# Patient Record
Sex: FEMALE | Race: WHITE | NOT HISPANIC OR LATINO | Employment: OTHER | URBAN - METROPOLITAN AREA
[De-identification: names, ages, dates, MRNs, and addresses within clinical notes are randomized per-mention and may not be internally consistent; named-entity substitution may affect disease eponyms.]

---

## 2023-06-28 ENCOUNTER — PATIENT OUTREACH (OUTPATIENT)
Dept: CARE COORDINATION | Facility: CLINIC | Age: 73
End: 2023-06-28
Payer: MEDICARE

## 2023-06-28 NOTE — PROGRESS NOTES
Patient returned CM call states she is home and doing well, patient and  are looking for PCP within their area, CM gave patient number to scheduling to find a provider near their home.  Patient did not have any questions at this time.  MELINDA RandleN, RN.

## 2023-07-13 ENCOUNTER — APPOINTMENT (OUTPATIENT)
Dept: PRIMARY CARE | Facility: CLINIC | Age: 73
End: 2023-07-13
Payer: MEDICARE

## 2023-07-18 ENCOUNTER — OFFICE VISIT (OUTPATIENT)
Dept: PRIMARY CARE | Facility: CLINIC | Age: 73
End: 2023-07-18
Payer: MEDICARE

## 2023-07-18 VITALS
BODY MASS INDEX: 35.65 KG/M2 | SYSTOLIC BLOOD PRESSURE: 120 MMHG | HEART RATE: 65 BPM | HEIGHT: 63 IN | WEIGHT: 201.2 LBS | OXYGEN SATURATION: 95 % | DIASTOLIC BLOOD PRESSURE: 80 MMHG

## 2023-07-18 DIAGNOSIS — I10 PRIMARY HYPERTENSION: ICD-10-CM

## 2023-07-18 DIAGNOSIS — L71.9 ACNE ROSACEA: ICD-10-CM

## 2023-07-18 DIAGNOSIS — E55.9 VITAMIN D DEFICIENCY: ICD-10-CM

## 2023-07-18 DIAGNOSIS — Z12.31 ENCOUNTER FOR SCREENING MAMMOGRAM FOR BREAST CANCER: Primary | ICD-10-CM

## 2023-07-18 DIAGNOSIS — E53.8 VITAMIN B12 DEFICIENCY: ICD-10-CM

## 2023-07-18 PROCEDURE — 3079F DIAST BP 80-89 MM HG: CPT | Performed by: FAMILY MEDICINE

## 2023-07-18 PROCEDURE — 1159F MED LIST DOCD IN RCRD: CPT | Performed by: FAMILY MEDICINE

## 2023-07-18 PROCEDURE — 1160F RVW MEDS BY RX/DR IN RCRD: CPT | Performed by: FAMILY MEDICINE

## 2023-07-18 PROCEDURE — 99214 OFFICE O/P EST MOD 30 MIN: CPT | Performed by: FAMILY MEDICINE

## 2023-07-18 PROCEDURE — 3074F SYST BP LT 130 MM HG: CPT | Performed by: FAMILY MEDICINE

## 2023-07-18 PROCEDURE — 1036F TOBACCO NON-USER: CPT | Performed by: FAMILY MEDICINE

## 2023-07-18 RX ORDER — AMLODIPINE BESYLATE 5 MG/1
5 TABLET ORAL
COMMUNITY
End: 2023-12-08 | Stop reason: SDUPTHER

## 2023-07-18 RX ORDER — DOXYCYCLINE HYCLATE 20 MG
20 TABLET ORAL 2 TIMES DAILY
COMMUNITY

## 2023-07-18 RX ORDER — DICLOFENAC SODIUM 10 MG/G
GEL TOPICAL
COMMUNITY
Start: 2022-01-11

## 2023-07-18 RX ORDER — ATENOLOL 25 MG/1
25 TABLET ORAL
COMMUNITY
End: 2023-12-08 | Stop reason: SDUPTHER

## 2023-07-18 RX ORDER — CYCLOSPORINE 0.5 MG/ML
EMULSION OPHTHALMIC
COMMUNITY

## 2023-07-18 RX ORDER — DULOXETIN HYDROCHLORIDE 30 MG/1
30 CAPSULE, DELAYED RELEASE ORAL
COMMUNITY
End: 2024-02-05 | Stop reason: SDUPTHER

## 2023-07-18 RX ORDER — SIMVASTATIN 20 MG/1
20 TABLET, FILM COATED ORAL EVERY EVENING
COMMUNITY
End: 2023-07-26 | Stop reason: SDUPTHER

## 2023-07-18 RX ORDER — ERYTHROMYCIN 5 MG/G
OINTMENT OPHTHALMIC
COMMUNITY
Start: 2014-11-13

## 2023-07-18 RX ORDER — UBIDECARENONE 75 MG
500 CAPSULE ORAL
COMMUNITY

## 2023-07-18 NOTE — PROGRESS NOTES
"Subjective   Patient ID: Therese Mike is a 72 y.o. female who presents for New Patient Visit (New patient here to establish care.).    HPI   Previous family doctor retired  Jadon guadarrama by     Admitted in June for hypoxia with neg covid flu c. Diff  mild hyokalemia, sl elevated ast neg bnp and troponin  had stomach flu  and after a week was better     HTN well controlled on meds    Hyperlipidemia on zocor dx 2005 with no problems    Dry eye syndrome     H/o vit d def but was normal last check      Will restart vitamin D     Vitamin b12 on po daily     Acne rosacea Derm     Colonoscopy x 3 sees DRT this August for normal follow up      Last was 2020  Mammogram per gyn       Involved in [facility]  3 crosses in [place]       Review of Systems    Objective   /80   Pulse 65   Ht 1.598 m (5' 2.91\")   Wt 91.3 kg (201 lb 3.2 oz)   SpO2 95%   BMI 35.74 kg/m²     Physical Exam  Vitals reviewed.   Constitutional:       Appearance: Normal appearance.   HENT:      Head: Normocephalic and atraumatic.   Eyes:      Conjunctiva/sclera: Conjunctivae normal.   Cardiovascular:      Rate and Rhythm: Normal rate and regular rhythm.   Pulmonary:      Effort: Pulmonary effort is normal.      Breath sounds: Normal breath sounds.   Musculoskeletal:      Cervical back: Neck supple.   Skin:     General: Skin is warm and dry.   Neurological:      General: No focal deficit present.      Mental Status: She is alert and oriented to person, place, and time.   Psychiatric:         Mood and Affect: Mood normal.         Behavior: Behavior normal.         Thought Content: Thought content normal.         Judgment: Judgment normal.         Assessment/Plan   Diagnoses and all orders for this visit:  Encounter for screening mammogram for breast cancer  -     BI mammo bilateral screening tomosynthesis; Future  Acne rosacea  -     Referral to Dermatology; Future  Primary hypertension  -     Lipid Panel; Future  -     Basic Metabolic Panel; " Future  Vitamin D deficiency  -     Vitamin D 25-Hydroxy,Total; Future  Vitamin B12 deficiency  -     Vitamin B12; Future

## 2023-07-24 DIAGNOSIS — E78.5 HYPERLIPIDEMIA, UNSPECIFIED HYPERLIPIDEMIA TYPE: ICD-10-CM

## 2023-07-24 RX ORDER — SIMVASTATIN 20 MG/1
20 TABLET, FILM COATED ORAL EVERY EVENING
Qty: 90 TABLET | Refills: 3 | OUTPATIENT
Start: 2023-07-24

## 2023-07-26 PROBLEM — E78.5 HYPERLIPIDEMIA: Status: ACTIVE | Noted: 2023-07-26

## 2023-07-26 RX ORDER — SIMVASTATIN 20 MG/1
20 TABLET, FILM COATED ORAL EVERY EVENING
Qty: 90 TABLET | Refills: 3 | Status: SHIPPED | OUTPATIENT
Start: 2023-07-26 | End: 2024-02-05 | Stop reason: SDUPTHER

## 2023-10-02 ENCOUNTER — HOSPITAL ENCOUNTER (OUTPATIENT)
Dept: RADIOLOGY | Facility: HOSPITAL | Age: 73
Discharge: HOME | End: 2023-10-02
Payer: MEDICARE

## 2023-10-02 DIAGNOSIS — K86.2 CYST OF PANCREAS (HHS-HCC): ICD-10-CM

## 2023-10-02 PROCEDURE — 2550000001 HC RX 255 CONTRASTS: Performed by: INTERNAL MEDICINE

## 2023-10-02 PROCEDURE — 74183 MRI ABD W/O CNTR FLWD CNTR: CPT | Performed by: RADIOLOGY

## 2023-10-02 PROCEDURE — A9575 INJ GADOTERATE MEGLUMI 0.1ML: HCPCS | Performed by: INTERNAL MEDICINE

## 2023-10-02 PROCEDURE — 74183 MRI ABD W/O CNTR FLWD CNTR: CPT

## 2023-10-02 RX ORDER — GADOTERATE MEGLUMINE 376.9 MG/ML
18 INJECTION INTRAVENOUS
Status: COMPLETED | OUTPATIENT
Start: 2023-10-02 | End: 2023-10-02

## 2023-10-02 RX ADMIN — GADOTERATE MEGLUMINE 18 ML: 376.9 INJECTION INTRAVENOUS at 16:15

## 2023-12-05 DIAGNOSIS — I10 PRIMARY HYPERTENSION: ICD-10-CM

## 2023-12-06 RX ORDER — AMLODIPINE BESYLATE 5 MG/1
TABLET ORAL
Refills: 0 | OUTPATIENT
Start: 2023-12-06

## 2023-12-06 RX ORDER — ATENOLOL 25 MG/1
TABLET ORAL
Refills: 0 | OUTPATIENT
Start: 2023-12-06

## 2023-12-06 RX ORDER — DULOXETIN HYDROCHLORIDE 30 MG/1
CAPSULE, DELAYED RELEASE ORAL
Refills: 0 | OUTPATIENT
Start: 2023-12-06

## 2023-12-08 RX ORDER — DULOXETIN HYDROCHLORIDE 30 MG/1
30 CAPSULE, DELAYED RELEASE ORAL
Qty: 30 CAPSULE | Refills: 0 | Status: CANCELLED | OUTPATIENT
Start: 2023-12-08

## 2023-12-08 NOTE — TELEPHONE ENCOUNTER
Patient requesting refills of duloxetine (30 days), atenolol (60 days) and amlodipine (90 days) sent to [facility]   Last OV:7/18/23  No upcoming appt scheduled  Orders pended

## 2023-12-08 NOTE — TELEPHONE ENCOUNTER
Needing refills of duloxetine (30 days), atenolol (60 days) and amlodipine (90 days) sent to [facility]

## 2023-12-11 ENCOUNTER — TELEPHONE (OUTPATIENT)
Dept: PRIMARY CARE | Facility: CLINIC | Age: 73
End: 2023-12-11
Payer: MEDICARE

## 2023-12-11 RX ORDER — ATENOLOL 25 MG/1
25 TABLET ORAL
Qty: 30 TABLET | Refills: 0 | Status: SHIPPED | OUTPATIENT
Start: 2023-12-11 | End: 2024-02-05 | Stop reason: SDUPTHER

## 2023-12-11 RX ORDER — AMLODIPINE BESYLATE 5 MG/1
5 TABLET ORAL
Qty: 30 TABLET | Refills: 0 | Status: SHIPPED | OUTPATIENT
Start: 2023-12-11 | End: 2024-02-05 | Stop reason: SDUPTHER

## 2023-12-11 NOTE — TELEPHONE ENCOUNTER
LVM for patient to return call concerning her request for medication refills.  Also need to know why she is on Cymbalta

## 2023-12-11 NOTE — TELEPHONE ENCOUNTER
Patient requesting a prescription for duloxetine, states she has took this medication due to having a warm body temperature.    Please advise.

## 2023-12-12 ENCOUNTER — TELEPHONE (OUTPATIENT)
Dept: PRIMARY CARE | Facility: CLINIC | Age: 73
End: 2023-12-12
Payer: MEDICARE

## 2023-12-12 NOTE — TELEPHONE ENCOUNTER
Patient called and said you called her asking her about her Duloxetine?   She said she uses it for body temperature and has been taking it for years.   Please call if you have any other questions    Spoke to pt. Informed pt that our cystoscopy appointments are booking out into August but pt stated she would rather stick to June 16th and was given the [facility] number per request to see if they have sooner availability than June 16th. Pt was thankful and confirmed she understood.

## 2023-12-12 NOTE — TELEPHONE ENCOUNTER
Not sure what that means.  Does she mean fibromyalagia depression anxiety neuropathy.  Those are the indications for duloxetine.  Does she take 30 mg?  She is a new patient to me so I don't know her well.

## 2023-12-12 NOTE — TELEPHONE ENCOUNTER
MD Margie Bhatia MA  Caller: Unspecified (Yesterday,  5:03 PM)  Not sure what that means.  Does she mean fibromyalagia depression anxiety neuropathy.  Those are the indications for duloxetine.  Does she take 30 mg?  She is a new patient to me so I don't know her well.      LVM for patient to return call about the duloxetine.  What was she taking the medication for (fibro, depression, anxiety, neuropathy)

## 2024-01-22 ENCOUNTER — APPOINTMENT (OUTPATIENT)
Dept: PRIMARY CARE | Facility: CLINIC | Age: 74
End: 2024-01-22
Payer: MEDICARE

## 2024-01-23 ENCOUNTER — APPOINTMENT (OUTPATIENT)
Dept: PRIMARY CARE | Facility: CLINIC | Age: 74
End: 2024-01-23
Payer: MEDICARE

## 2024-01-29 ENCOUNTER — APPOINTMENT (OUTPATIENT)
Dept: PRIMARY CARE | Facility: CLINIC | Age: 74
End: 2024-01-29
Payer: MEDICARE

## 2024-01-29 ENCOUNTER — LAB (OUTPATIENT)
Dept: LAB | Facility: LAB | Age: 74
End: 2024-01-29
Payer: MEDICARE

## 2024-01-29 DIAGNOSIS — E55.9 VITAMIN D DEFICIENCY: ICD-10-CM

## 2024-01-29 DIAGNOSIS — I10 PRIMARY HYPERTENSION: ICD-10-CM

## 2024-01-29 DIAGNOSIS — E53.8 VITAMIN B12 DEFICIENCY: ICD-10-CM

## 2024-01-29 LAB
25(OH)D3 SERPL-MCNC: 25 NG/ML (ref 30–100)
ANION GAP SERPL CALC-SCNC: 11 MMOL/L (ref 10–20)
BUN SERPL-MCNC: 13 MG/DL (ref 6–23)
CALCIUM SERPL-MCNC: 9.8 MG/DL (ref 8.6–10.3)
CHLORIDE SERPL-SCNC: 106 MMOL/L (ref 98–107)
CHOLEST SERPL-MCNC: 216 MG/DL (ref 0–199)
CHOLESTEROL/HDL RATIO: 2.9
CO2 SERPL-SCNC: 29 MMOL/L (ref 21–32)
CREAT SERPL-MCNC: 0.66 MG/DL (ref 0.5–1.05)
EGFRCR SERPLBLD CKD-EPI 2021: >90 ML/MIN/1.73M*2
GLUCOSE SERPL-MCNC: 83 MG/DL (ref 74–99)
HDLC SERPL-MCNC: 74 MG/DL
LDLC SERPL CALC-MCNC: 106 MG/DL
NON HDL CHOLESTEROL: 142 MG/DL (ref 0–149)
POTASSIUM SERPL-SCNC: 5 MMOL/L (ref 3.5–5.3)
SODIUM SERPL-SCNC: 141 MMOL/L (ref 136–145)
TRIGL SERPL-MCNC: 179 MG/DL (ref 0–149)
VIT B12 SERPL-MCNC: 698 PG/ML (ref 211–911)
VLDL: 36 MG/DL (ref 0–40)

## 2024-01-29 PROCEDURE — 82306 VITAMIN D 25 HYDROXY: CPT

## 2024-01-29 PROCEDURE — 36415 COLL VENOUS BLD VENIPUNCTURE: CPT

## 2024-01-29 PROCEDURE — 82607 VITAMIN B-12: CPT

## 2024-01-29 PROCEDURE — 80048 BASIC METABOLIC PNL TOTAL CA: CPT

## 2024-01-29 PROCEDURE — 80061 LIPID PANEL: CPT

## 2024-02-05 ENCOUNTER — OFFICE VISIT (OUTPATIENT)
Dept: PRIMARY CARE | Facility: CLINIC | Age: 74
End: 2024-02-05
Payer: MEDICARE

## 2024-02-05 VITALS
HEIGHT: 64 IN | DIASTOLIC BLOOD PRESSURE: 82 MMHG | BODY MASS INDEX: 33.8 KG/M2 | OXYGEN SATURATION: 95 % | HEART RATE: 65 BPM | WEIGHT: 198 LBS | TEMPERATURE: 96.9 F | SYSTOLIC BLOOD PRESSURE: 130 MMHG

## 2024-02-05 DIAGNOSIS — R23.2 HOT FLASHES: ICD-10-CM

## 2024-02-05 DIAGNOSIS — E55.9 VITAMIN D DEFICIENCY: ICD-10-CM

## 2024-02-05 DIAGNOSIS — E78.5 HYPERLIPIDEMIA, UNSPECIFIED HYPERLIPIDEMIA TYPE: ICD-10-CM

## 2024-02-05 DIAGNOSIS — Z00.00 ROUTINE GENERAL MEDICAL EXAMINATION AT HEALTH CARE FACILITY: Primary | ICD-10-CM

## 2024-02-05 DIAGNOSIS — Z78.0 MENOPAUSE: ICD-10-CM

## 2024-02-05 DIAGNOSIS — E53.8 VITAMIN B12 DEFICIENCY: ICD-10-CM

## 2024-02-05 DIAGNOSIS — I10 PRIMARY HYPERTENSION: ICD-10-CM

## 2024-02-05 PROBLEM — E78.2 MIXED HYPERLIPIDEMIA: Status: ACTIVE | Noted: 2020-03-13

## 2024-02-05 PROBLEM — L30.9 DERMATITIS: Status: ACTIVE | Noted: 2020-03-13

## 2024-02-05 PROBLEM — J30.9 ALLERGIC RHINITIS: Status: ACTIVE | Noted: 2024-02-05

## 2024-02-05 PROBLEM — R19.4 CHANGE IN BOWEL HABITS: Status: ACTIVE | Noted: 2024-02-05

## 2024-02-05 PROBLEM — N32.89 BLADDER WALL THICKENING: Status: ACTIVE | Noted: 2024-02-05

## 2024-02-05 PROBLEM — M81.0 OSTEOPOROSIS: Status: ACTIVE | Noted: 2024-02-05

## 2024-02-05 PROBLEM — M54.16 LUMBAR RADICULOPATHY: Status: ACTIVE | Noted: 2024-02-05

## 2024-02-05 PROBLEM — F32.A DEPRESSIVE DISORDER: Status: ACTIVE | Noted: 2024-02-05

## 2024-02-05 PROBLEM — F41.9 ANXIETY DISORDER: Status: ACTIVE | Noted: 2024-02-05

## 2024-02-05 PROBLEM — M19.90 ARTHRITIS: Status: ACTIVE | Noted: 2024-02-05

## 2024-02-05 PROBLEM — N32.89 HYPERTROPHY OF BLADDER: Status: ACTIVE | Noted: 2024-02-05

## 2024-02-05 PROBLEM — K86.2 CYST OF PANCREAS (HHS-HCC): Status: ACTIVE | Noted: 2024-02-05

## 2024-02-05 PROBLEM — G47.00 INSOMNIA: Status: ACTIVE | Noted: 2024-02-05

## 2024-02-05 PROCEDURE — 1160F RVW MEDS BY RX/DR IN RCRD: CPT | Performed by: FAMILY MEDICINE

## 2024-02-05 PROCEDURE — 99214 OFFICE O/P EST MOD 30 MIN: CPT | Performed by: FAMILY MEDICINE

## 2024-02-05 PROCEDURE — 3075F SYST BP GE 130 - 139MM HG: CPT | Performed by: FAMILY MEDICINE

## 2024-02-05 PROCEDURE — G0439 PPPS, SUBSEQ VISIT: HCPCS | Performed by: FAMILY MEDICINE

## 2024-02-05 PROCEDURE — 1124F ACP DISCUSS-NO DSCNMKR DOCD: CPT | Performed by: FAMILY MEDICINE

## 2024-02-05 PROCEDURE — 1036F TOBACCO NON-USER: CPT | Performed by: FAMILY MEDICINE

## 2024-02-05 PROCEDURE — 1159F MED LIST DOCD IN RCRD: CPT | Performed by: FAMILY MEDICINE

## 2024-02-05 PROCEDURE — 3079F DIAST BP 80-89 MM HG: CPT | Performed by: FAMILY MEDICINE

## 2024-02-05 PROCEDURE — 1170F FXNL STATUS ASSESSED: CPT | Performed by: FAMILY MEDICINE

## 2024-02-05 RX ORDER — AMLODIPINE BESYLATE 5 MG/1
5 TABLET ORAL DAILY
Qty: 90 TABLET | Refills: 3 | Status: SHIPPED | OUTPATIENT
Start: 2024-02-05 | End: 2025-02-04

## 2024-02-05 RX ORDER — DULOXETIN HYDROCHLORIDE 30 MG/1
30 CAPSULE, DELAYED RELEASE ORAL
Qty: 90 CAPSULE | Refills: 3 | Status: SHIPPED | OUTPATIENT
Start: 2024-02-05 | End: 2025-02-04

## 2024-02-05 RX ORDER — SIMVASTATIN 20 MG/1
20 TABLET, FILM COATED ORAL EVERY EVENING
Qty: 90 TABLET | Refills: 3 | Status: SHIPPED | OUTPATIENT
Start: 2024-02-05 | End: 2025-02-04

## 2024-02-05 RX ORDER — ATENOLOL 25 MG/1
25 TABLET ORAL DAILY
Qty: 90 TABLET | Refills: 3 | Status: SHIPPED | OUTPATIENT
Start: 2024-02-05 | End: 2025-02-04

## 2024-02-05 ASSESSMENT — PATIENT HEALTH QUESTIONNAIRE - PHQ9
2. FEELING DOWN, DEPRESSED OR HOPELESS: NOT AT ALL
1. LITTLE INTEREST OR PLEASURE IN DOING THINGS: NOT AT ALL
SUM OF ALL RESPONSES TO PHQ9 QUESTIONS 1 AND 2: 0

## 2024-02-05 ASSESSMENT — ACTIVITIES OF DAILY LIVING (ADL)
GROCERY_SHOPPING: INDEPENDENT
MANAGING_FINANCES: INDEPENDENT
BATHING: INDEPENDENT
TAKING_MEDICATION: INDEPENDENT
DRESSING: INDEPENDENT
DOING_HOUSEWORK: INDEPENDENT

## 2024-02-05 ASSESSMENT — ENCOUNTER SYMPTOMS
DEPRESSION: 0
LOSS OF SENSATION IN FEET: 0
OCCASIONAL FEELINGS OF UNSTEADINESS: 0

## 2024-02-05 NOTE — PROGRESS NOTES
Hypertension:  She is not exercising and is eating a low salt diet.  Blood pressure is being monitored at home. Cardiac symptoms none. Patient denies chest pain, chest pressure/discomfort, dyspnea, lower extremity edema, and palpitations.      Hyperlipidemia:  Her last labs showed   Lab Results   Component Value Date    LDLCALC 106 (H) 01/29/2024    . There is not a family history of heart disease or strokes. She admits to taking medication as directed and denies having side effects to medication.

## 2024-02-05 NOTE — PROGRESS NOTES
"Subjective   Reason for Visit: Therese Mike is an 73 y.o. female here for a Medicare Wellness visit.     Past Medical, Surgical, and Family History reviewed and updated in chart.    Reviewed all medications by prescribing practitioner or clinical pharmacist (such as prescriptions, OTCs, herbal therapies and supplements) and documented in the medical record.      Goes by [facility]  yes   Living will yes   Code Full code     Labs reviewed for Jan 2024     Admitted in June for hypoxia with neg covid flu c. Diff  mild hyokalemia, sl elevated ast neg bnp and troponin  had stomach flu  and after a week was better      HTN well controlled on meds     Hyperlipidemia on zocor dx 2005 with no problems    Chol is borderline      Diet likes diary will go to low fat options     Dry eye syndrome      H/o vit d def but was normal last check      Will restart vitamin D       Was only 25 Jan 2024      Vitamin b12 on po daily      Acne rosacea Derm      Colonoscopy x 3 sees DRT this August for normal follow up      Last was 2020  Mammogram per gyn        Involved in [facility]  3 crosses in [place]     2 months had sinus infection  Went to urgent care used a zpak for a sinus infection      Patient Care Team:  Bradly Mcdaniel MD as PCP - General (Family Medicine)  Mo Potter DO as PCP - [employer] PCP     Review of Systems    Objective   Vitals:  /82 (BP Location: Left arm, Patient Position: Sitting)   Pulse 65   Temp 36.1 °C (96.9 °F)   Ht 1.626 m (5' 4\")   Wt 89.8 kg (198 lb)   SpO2 95%   BMI 33.99 kg/m²       Physical Exam  Vitals reviewed.   Constitutional:       Appearance: Normal appearance.   HENT:      Head: Normocephalic and atraumatic.      Right Ear: Tympanic membrane, ear canal and external ear normal.      Left Ear: Tympanic membrane, ear canal and external ear normal.      Nose: Nose normal.      Mouth/Throat:      Mouth: Mucous membranes are moist.      Pharynx: Oropharynx is clear. "   Eyes:      Conjunctiva/sclera: Conjunctivae normal.   Cardiovascular:      Rate and Rhythm: Normal rate and regular rhythm.   Pulmonary:      Effort: Pulmonary effort is normal.      Breath sounds: Normal breath sounds.   Abdominal:      General: Bowel sounds are normal. There is no distension.      Palpations: Abdomen is soft.   Musculoskeletal:      Cervical back: Neck supple.   Skin:     General: Skin is warm and dry.   Neurological:      General: No focal deficit present.      Mental Status: She is alert and oriented to person, place, and time.   Psychiatric:         Mood and Affect: Mood normal.         Behavior: Behavior normal.         Thought Content: Thought content normal.         Judgment: Judgment normal.         Assessment/Plan   Problem List Items Addressed This Visit       Vitamin D deficiency    Relevant Orders    Vitamin D 25-Hydroxy,Total (for eval of Vitamin D levels)     Other Visit Diagnoses       Menopause    -  Primary    Relevant Orders    XR DEXA bone density    Primary hypertension        Relevant Medications    amLODIPine (Norvasc) 5 mg tablet    atenolol (Tenormin) 25 mg tablet    Other Relevant Orders    Basic Metabolic Panel    CBC    Lipid Panel    Hot flashes        Relevant Medications    DULoxetine (Cymbalta) 30 mg DR capsule    Hyperlipidemia, unspecified hyperlipidemia type        Relevant Medications    simvastatin (Zocor) 20 mg tablet    Other Relevant Orders    Basic Metabolic Panel    CBC    Lipid Panel    Vitamin D 25-Hydroxy,Total (for eval of Vitamin D levels)    Vitamin B12    Vitamin B12 deficiency        Relevant Orders    Vitamin B12

## 2024-03-11 ENCOUNTER — HOSPITAL ENCOUNTER (OUTPATIENT)
Dept: RADIOLOGY | Facility: CLINIC | Age: 74
Discharge: HOME | End: 2024-03-11
Payer: MEDICARE

## 2024-03-11 DIAGNOSIS — Z78.0 MENOPAUSE: ICD-10-CM

## 2024-03-11 PROCEDURE — 77080 DXA BONE DENSITY AXIAL: CPT

## 2024-09-03 DIAGNOSIS — I10 PRIMARY HYPERTENSION: ICD-10-CM

## 2024-09-03 RX ORDER — ATENOLOL 25 MG/1
25 TABLET ORAL DAILY
Qty: 30 TABLET | Refills: 0 | Status: SHIPPED | OUTPATIENT
Start: 2024-09-03

## 2024-09-03 NOTE — TELEPHONE ENCOUNTER
Please send a 30 day supply to [facility], this will last her until she is able to receive her mail order script  atenolol (Tenormin) 25 mg tablet

## 2024-09-03 NOTE — TELEPHONE ENCOUNTER
Please send a 30 day supply to [facility], this will last her until she is able to receive her mail order script  atenolol (Tenormin) 25 mg tablet     Order pended

## 2024-09-11 ENCOUNTER — TELEPHONE (OUTPATIENT)
Dept: PRIMARY CARE | Facility: CLINIC | Age: 74
End: 2024-09-11
Payer: MEDICARE

## 2024-09-11 NOTE — TELEPHONE ENCOUNTER
Spoke to pt who expressed a verbal understanding of what PCP advised and is going to check with the pharmacy to make sure sure she got the right medication.

## 2024-09-11 NOTE — TELEPHONE ENCOUNTER
PT has been taking Atenolol for a long time, and this last time she got it filled, she noticed the shape and color of the pill was different thinks manufacture change. Since she started this refill, she is experiencing dizziness even when she is just sitting there, checked BP and it was 170/90. Denies chest pain, SOB, Jaw, shoulder or upper pain. Also denies N/V and denies loss of conscious or blurry vision and no headache with the dizziness. No appointments available to get it until next week. Does not know what she needs to do and would like advise.

## 2024-09-12 ENCOUNTER — TELEPHONE (OUTPATIENT)
Dept: PRIMARY CARE | Facility: CLINIC | Age: 74
End: 2024-09-12
Payer: MEDICARE

## 2024-09-12 DIAGNOSIS — I10 PRIMARY HYPERTENSION: ICD-10-CM

## 2024-09-12 RX ORDER — ATENOLOL 25 MG/1
25 TABLET ORAL DAILY
Qty: 90 TABLET | Refills: 1 | Status: CANCELLED | OUTPATIENT
Start: 2024-09-12

## 2024-09-12 NOTE — TELEPHONE ENCOUNTER
Needing new prescription of atenolol 25 mg sent to [facility], because the ones from  are a different  and she is having side effects from them. Asking for a call back.

## 2024-09-13 DIAGNOSIS — I10 PRIMARY HYPERTENSION: ICD-10-CM

## 2024-09-13 RX ORDER — ATENOLOL 25 MG/1
25 TABLET ORAL DAILY
Qty: 90 TABLET | Refills: 3 | Status: SHIPPED | OUTPATIENT
Start: 2024-09-13 | End: 2025-09-13

## 2024-11-25 ENCOUNTER — TELEPHONE (OUTPATIENT)
Dept: PRIMARY CARE | Facility: CLINIC | Age: 74
End: 2024-11-25
Payer: MEDICARE

## 2024-11-25 NOTE — TELEPHONE ENCOUNTER
Placed call to pt to yajaira Feb OV with Dr. Mcdaniel. She stated she did get our VM's from last week about this. She was driving when I called and stated she will call us back ton 11/26/24 to reschedule.

## 2024-12-30 ENCOUNTER — APPOINTMENT (OUTPATIENT)
Dept: RADIOLOGY | Facility: HOSPITAL | Age: 74
End: 2024-12-30
Payer: MEDICARE

## 2024-12-30 ENCOUNTER — HOSPITAL ENCOUNTER (EMERGENCY)
Facility: HOSPITAL | Age: 74
Discharge: HOME | End: 2024-12-30
Attending: EMERGENCY MEDICINE
Payer: MEDICARE

## 2024-12-30 VITALS
BODY MASS INDEX: 33.13 KG/M2 | SYSTOLIC BLOOD PRESSURE: 125 MMHG | OXYGEN SATURATION: 94 % | WEIGHT: 193 LBS | RESPIRATION RATE: 16 BRPM | DIASTOLIC BLOOD PRESSURE: 75 MMHG | HEART RATE: 62 BPM

## 2024-12-30 DIAGNOSIS — R10.31 RIGHT LOWER QUADRANT ABDOMINAL PAIN: Primary | ICD-10-CM

## 2024-12-30 LAB
ALBUMIN SERPL BCP-MCNC: 3.9 G/DL (ref 3.4–5)
ALP SERPL-CCNC: 108 U/L (ref 33–136)
ALT SERPL W P-5'-P-CCNC: 29 U/L (ref 7–45)
ANION GAP SERPL CALC-SCNC: 10 MMOL/L (ref 10–20)
APPEARANCE UR: CLEAR
AST SERPL W P-5'-P-CCNC: 22 U/L (ref 9–39)
BASOPHILS # BLD AUTO: 0.04 X10*3/UL (ref 0–0.1)
BASOPHILS NFR BLD AUTO: 0.9 %
BILIRUB SERPL-MCNC: 0.5 MG/DL (ref 0–1.2)
BILIRUB UR STRIP.AUTO-MCNC: NEGATIVE MG/DL
BUN SERPL-MCNC: 16 MG/DL (ref 6–23)
CALCIUM SERPL-MCNC: 9.7 MG/DL (ref 8.6–10.3)
CHLORIDE SERPL-SCNC: 105 MMOL/L (ref 98–107)
CO2 SERPL-SCNC: 27 MMOL/L (ref 21–32)
COLOR UR: NORMAL
CREAT SERPL-MCNC: 0.6 MG/DL (ref 0.5–1.05)
EGFRCR SERPLBLD CKD-EPI 2021: >90 ML/MIN/1.73M*2
EOSINOPHIL # BLD AUTO: 0.09 X10*3/UL (ref 0–0.4)
EOSINOPHIL NFR BLD AUTO: 2.1 %
ERYTHROCYTE [DISTWIDTH] IN BLOOD BY AUTOMATED COUNT: 12.5 % (ref 11.5–14.5)
GLUCOSE SERPL-MCNC: 89 MG/DL (ref 74–99)
GLUCOSE UR STRIP.AUTO-MCNC: NORMAL MG/DL
HCT VFR BLD AUTO: 45.7 % (ref 36–46)
HGB BLD-MCNC: 14.7 G/DL (ref 12–16)
IMM GRANULOCYTES # BLD AUTO: 0.02 X10*3/UL (ref 0–0.5)
IMM GRANULOCYTES NFR BLD AUTO: 0.5 % (ref 0–0.9)
KETONES UR STRIP.AUTO-MCNC: NEGATIVE MG/DL
LACTATE SERPL-SCNC: 0.8 MMOL/L (ref 0.4–2)
LEUKOCYTE ESTERASE UR QL STRIP.AUTO: NEGATIVE
LIPASE SERPL-CCNC: 36 U/L (ref 9–82)
LYMPHOCYTES # BLD AUTO: 1.21 X10*3/UL (ref 0.8–3)
LYMPHOCYTES NFR BLD AUTO: 28.3 %
MCH RBC QN AUTO: 29.8 PG (ref 26–34)
MCHC RBC AUTO-ENTMCNC: 32.2 G/DL (ref 32–36)
MCV RBC AUTO: 93 FL (ref 80–100)
MONOCYTES # BLD AUTO: 0.48 X10*3/UL (ref 0.05–0.8)
MONOCYTES NFR BLD AUTO: 11.2 %
NEUTROPHILS # BLD AUTO: 2.43 X10*3/UL (ref 1.6–5.5)
NEUTROPHILS NFR BLD AUTO: 57 %
NITRITE UR QL STRIP.AUTO: NEGATIVE
NRBC BLD-RTO: 0 /100 WBCS (ref 0–0)
PH UR STRIP.AUTO: 6 [PH]
PLATELET # BLD AUTO: 244 X10*3/UL (ref 150–450)
POTASSIUM SERPL-SCNC: 4.4 MMOL/L (ref 3.5–5.3)
PROT SERPL-MCNC: 5.9 G/DL (ref 6.4–8.2)
PROT UR STRIP.AUTO-MCNC: NEGATIVE MG/DL
RBC # BLD AUTO: 4.94 X10*6/UL (ref 4–5.2)
RBC # UR STRIP.AUTO: NEGATIVE /UL
SODIUM SERPL-SCNC: 138 MMOL/L (ref 136–145)
SP GR UR STRIP.AUTO: 1.02
UROBILINOGEN UR STRIP.AUTO-MCNC: NORMAL MG/DL
WBC # BLD AUTO: 4.3 X10*3/UL (ref 4.4–11.3)

## 2024-12-30 PROCEDURE — 96374 THER/PROPH/DIAG INJ IV PUSH: CPT | Mod: 59

## 2024-12-30 PROCEDURE — 2500000001 HC RX 250 WO HCPCS SELF ADMINISTERED DRUGS (ALT 637 FOR MEDICARE OP): Performed by: EMERGENCY MEDICINE

## 2024-12-30 PROCEDURE — 2500000004 HC RX 250 GENERAL PHARMACY W/ HCPCS (ALT 636 FOR OP/ED): Performed by: EMERGENCY MEDICINE

## 2024-12-30 PROCEDURE — 36415 COLL VENOUS BLD VENIPUNCTURE: CPT | Performed by: EMERGENCY MEDICINE

## 2024-12-30 PROCEDURE — 2550000001 HC RX 255 CONTRASTS: Performed by: EMERGENCY MEDICINE

## 2024-12-30 PROCEDURE — 99285 EMERGENCY DEPT VISIT HI MDM: CPT | Mod: 25 | Performed by: EMERGENCY MEDICINE

## 2024-12-30 PROCEDURE — 74177 CT ABD & PELVIS W/CONTRAST: CPT | Performed by: RADIOLOGY

## 2024-12-30 PROCEDURE — 85025 COMPLETE CBC W/AUTO DIFF WBC: CPT | Performed by: EMERGENCY MEDICINE

## 2024-12-30 PROCEDURE — 74177 CT ABD & PELVIS W/CONTRAST: CPT

## 2024-12-30 PROCEDURE — 81003 URINALYSIS AUTO W/O SCOPE: CPT | Performed by: EMERGENCY MEDICINE

## 2024-12-30 PROCEDURE — 83605 ASSAY OF LACTIC ACID: CPT | Performed by: EMERGENCY MEDICINE

## 2024-12-30 PROCEDURE — 83690 ASSAY OF LIPASE: CPT | Performed by: EMERGENCY MEDICINE

## 2024-12-30 PROCEDURE — 96375 TX/PRO/DX INJ NEW DRUG ADDON: CPT

## 2024-12-30 PROCEDURE — 80053 COMPREHEN METABOLIC PANEL: CPT | Performed by: EMERGENCY MEDICINE

## 2024-12-30 RX ORDER — OXYCODONE AND ACETAMINOPHEN 5; 325 MG/1; MG/1
1 TABLET ORAL ONCE
Status: COMPLETED | OUTPATIENT
Start: 2024-12-30 | End: 2024-12-30

## 2024-12-30 RX ORDER — ASPIRIN 81 MG/1
81 TABLET ORAL DAILY
COMMUNITY

## 2024-12-30 RX ORDER — MORPHINE SULFATE 4 MG/ML
4 INJECTION, SOLUTION INTRAMUSCULAR; INTRAVENOUS ONCE
Status: COMPLETED | OUTPATIENT
Start: 2024-12-30 | End: 2024-12-30

## 2024-12-30 RX ORDER — IBUPROFEN 200 MG
400 TABLET ORAL EVERY 6 HOURS PRN
COMMUNITY

## 2024-12-30 RX ORDER — ONDANSETRON HYDROCHLORIDE 2 MG/ML
4 INJECTION, SOLUTION INTRAVENOUS ONCE
Status: COMPLETED | OUTPATIENT
Start: 2024-12-30 | End: 2024-12-30

## 2024-12-30 RX ADMIN — IOHEXOL 68 ML: 350 INJECTION, SOLUTION INTRAVENOUS at 19:00

## 2024-12-30 RX ADMIN — OXYCODONE HYDROCHLORIDE AND ACETAMINOPHEN 1 TABLET: 5; 325 TABLET ORAL at 21:37

## 2024-12-30 RX ADMIN — MORPHINE SULFATE 4 MG: 4 INJECTION, SOLUTION INTRAMUSCULAR; INTRAVENOUS at 19:33

## 2024-12-30 RX ADMIN — ONDANSETRON 4 MG: 2 INJECTION INTRAMUSCULAR; INTRAVENOUS at 19:32

## 2024-12-30 ASSESSMENT — PAIN SCALES - GENERAL
PAINLEVEL_OUTOF10: 7
PAINLEVEL_OUTOF10: 8
PAINLEVEL_OUTOF10: 8

## 2024-12-30 ASSESSMENT — COLUMBIA-SUICIDE SEVERITY RATING SCALE - C-SSRS
6. HAVE YOU EVER DONE ANYTHING, STARTED TO DO ANYTHING, OR PREPARED TO DO ANYTHING TO END YOUR LIFE?: NO
2. HAVE YOU ACTUALLY HAD ANY THOUGHTS OF KILLING YOURSELF?: NO
1. IN THE PAST MONTH, HAVE YOU WISHED YOU WERE DEAD OR WISHED YOU COULD GO TO SLEEP AND NOT WAKE UP?: NO

## 2024-12-30 NOTE — ED PROVIDER NOTES
HPI   Chief Complaint   Patient presents with    Abdominal Pain     RLQ pain since Thursday. Nausea, denies urinary symptoms.        The patient has had progressive right lower quadrant pain for the past several days.  Took a dose of her 's pain medication earlier today.  Denies fever.  No known sick contacts.      History provided by:  Patient and spouse   used: No            Patient History   Past Medical History:   Diagnosis Date    Hypertension     Personal history of diseases of the skin and subcutaneous tissue 01/30/2014    History of rosacea    Unspecified purulent endophthalmitis, unspecified eye     Eye infection     History reviewed. No pertinent surgical history.  Family History   Problem Relation Name Age of Onset    Other (open heart) Brother       Social History     Tobacco Use    Smoking status: Never    Smokeless tobacco: Never   Vaping Use    Vaping status: Never Used   Substance Use Topics    Alcohol use: Yes     Alcohol/week: 2.0 standard drinks of alcohol     Types: 2 Glasses of wine per week    Drug use: Never       Physical Exam   ED Triage Vitals [12/30/24 1736]   Temp Heart Rate Respirations BP   -- 67 20 168/70      Pulse Ox Temp src Heart Rate Source Patient Position   (!) 93 % -- -- --      BP Location FiO2 (%)     -- --       Physical Exam  Vitals and nursing note reviewed.   Constitutional:       General: She is not in acute distress.     Appearance: Normal appearance. She is normal weight. She is not ill-appearing, toxic-appearing or diaphoretic.   HENT:      Head: Normocephalic and atraumatic.      Nose: Nose normal. No rhinorrhea.   Neck:      Comments: Trachea is midline  Cardiovascular:      Rate and Rhythm: Normal rate and regular rhythm.      Heart sounds: No murmur heard.  Pulmonary:      Effort: Pulmonary effort is normal.      Breath sounds: Normal breath sounds. No wheezing.   Abdominal:      General: Abdomen is flat. Bowel sounds are normal. There  is no distension.      Palpations: Abdomen is soft.      Tenderness: There is abdominal tenderness in the right lower quadrant. There is no guarding or rebound. Positive signs include McBurney's sign. Negative signs include Mustafa's sign and Rovsing's sign.   Musculoskeletal:         General: Normal range of motion.      Cervical back: Normal range of motion.   Skin:     General: Skin is warm and dry.      Findings: No rash.   Neurological:      General: No focal deficit present.      Mental Status: She is alert and oriented to person, place, and time. Mental status is at baseline.   Psychiatric:         Mood and Affect: Mood normal.         Behavior: Behavior normal.         Thought Content: Thought content normal.         Judgment: Judgment normal.           ED Course & MDM                  No data recorded     [place] Coma Scale Score: 15 (12/30/24 7213 : Dot Riley RN)                           Medical Decision Making  Patient was medicated for her pain and made n.p.o. while here in the ER.    Patient was endorsed to the oncoming provider.  Please see their note for interpretation of pending studies and final disposition details        Procedure  Procedures     Luis Yusuf DO  12/30/24 6838

## 2024-12-31 LAB — HOLD SPECIMEN: NORMAL

## 2024-12-31 NOTE — ED PROVIDER NOTES
Emergency Medicine Transition of Care Note.    I received Therese Mike in signout from Dr. Yusuf.  Please see the previous ED provider note for all HPI, PE and MDM up to the time of signout at 7 PM. This is in addition to the primary record.    In brief Therese Mike is an 74 y.o. female presenting for   Chief Complaint   Patient presents with    Abdominal Pain     RLQ pain since Thursday. Nausea, denies urinary symptoms.      At the time of signout we were awaiting: Results of CT scan    Diagnoses as of 12/30/24 2135   Right lower quadrant abdominal pain       Medical Decision Making  Patient was checked out to me ending CT abdomen pelvis results.  It demonstrated some possible enteritis on the left side.  Patient's pain is on the right.  Otherwise his CT scan was unremarkable specifically no appendicitis.  Patient is feeling better after receiving some morphine.  Will give a short course of pain medicine and the patient can be discharged.        Final diagnoses:   [R10.31] Right lower quadrant abdominal pain           Procedure  Procedures    MD Ellis Fernandez MD  12/30/24 2135

## 2025-01-06 ENCOUNTER — OFFICE VISIT (OUTPATIENT)
Dept: URGENT CARE | Facility: CLINIC | Age: 75
End: 2025-01-06
Payer: MEDICARE

## 2025-01-06 VITALS
HEIGHT: 64 IN | TEMPERATURE: 96.5 F | WEIGHT: 193 LBS | SYSTOLIC BLOOD PRESSURE: 114 MMHG | DIASTOLIC BLOOD PRESSURE: 72 MMHG | RESPIRATION RATE: 14 BRPM | OXYGEN SATURATION: 95 % | HEART RATE: 67 BPM | BODY MASS INDEX: 32.95 KG/M2

## 2025-01-06 DIAGNOSIS — J01.00 ACUTE NON-RECURRENT MAXILLARY SINUSITIS: Primary | ICD-10-CM

## 2025-01-06 PROCEDURE — 99213 OFFICE O/P EST LOW 20 MIN: CPT | Performed by: NURSE PRACTITIONER

## 2025-01-06 RX ORDER — AMOXICILLIN AND CLAVULANATE POTASSIUM 875; 125 MG/1; MG/1
1 TABLET, FILM COATED ORAL 2 TIMES DAILY
Qty: 14 TABLET | Refills: 0 | Status: SHIPPED | OUTPATIENT
Start: 2025-01-06 | End: 2025-01-13

## 2025-01-06 NOTE — PROGRESS NOTES
74 y.o. female patient presents for evaluation of sinus pressure. Patient reports 1 week of progressively worsening maxillary sinus pain, nasal congestion, and headache. There is reported mild postnasal drip and ear pressure. No fever, cough, or other constitutional signs and symptoms. Symptoms have been refractory to over-the-counter medications.    Vitals:    01/06/25 1438   BP: 114/72   Pulse: 67   Resp: 14   Temp: 35.8 °C (96.5 °F)   SpO2: 95%       Allergies   Allergen Reactions    Ciprofloxacin Other    Codeine Other       Medication Documentation Review Audit       Reviewed by Nishi Narvaez MA (Medical Assistant) on 01/06/25 at 1436      Medication Order Taking? Sig Documenting Provider Last Dose Status   amLODIPine (Norvasc) 5 mg tablet [id number] Yes Take 1 tablet (5 mg) by mouth once daily. Bradly Mcdaniel MD  Active   aspirin 81 mg EC tablet [id number] Yes Take 1 tablet (81 mg) by mouth once daily. Historical Provider, MD  Active   atenolol (Tenormin) 25 mg tablet [id number] Yes Take 1 tablet (25 mg) by mouth once daily. Vasiliy Subramanian MD  Active   cyanocobalamin (Vitamin B-12) 1,000 mcg tablet [id number] Yes Take 1 tablet (1,000 mcg) by mouth once daily. Historical Provider, MD  Active   diclofenac sodium (Voltaren) 1 % gel gel [id number] Yes 4.5 inches (1 Application) if needed. Historical Provider, MD  Active   doxycycline (Periostat) 20 mg tablet [id number] Yes Take 1 tablet (20 mg) by mouth 2 times a day as needed (Rosacea flare). Take with a full glass of water and do not lie down for at least 30 minutes after. Historical Provider, MD  Active   DULoxetine (Cymbalta) 30 mg DR capsule [id number] Yes Take 1 capsule (30 mg) by mouth once daily. Bradly Mcdaniel MD  Active   erythromycin (Romycin) 5 mg/gram (0.5 %) ophthalmic ointment [id number] Yes Apply to affected eye(s) if needed. Historical Provider, MD  Active   ibuprofen 200 mg tablet [id number] Yes Take 2 tablets (400 mg) by mouth every 6 hours if needed  for mild pain (1 - 3). Historical Provider, MD  Active   Restasis 0.05 % ophthalmic emulsion [id number] Yes INSTILL 1 DROP INTO EACH EYE TWICE DAILY Historical Provider, MD  Active   simvastatin (Zocor) 20 mg tablet [id number] Yes Take 1 tablet (20 mg) by mouth once daily in the evening. Bradly Mcdaniel MD  Active                    Past Medical History:   Diagnosis Date    Hypertension     Personal history of diseases of the skin and subcutaneous tissue 01/30/2014    History of rosacea    Unspecified purulent endophthalmitis, unspecified eye     Eye infection       History reviewed. No pertinent surgical history.    ROS  See HPI    Physical Exam  Vitals and nursing note reviewed.   Constitutional:       Appearance: She is ill-appearing (mildly).   HENT:      Head: Normocephalic and atraumatic.      Right Ear: Tympanic membrane and ear canal normal.      Left Ear: Tympanic membrane and ear canal normal.      Nose: Congestion present.      Right Sinus: Maxillary sinus tenderness present.      Left Sinus: Maxillary sinus tenderness present.      Mouth/Throat:      Mouth: Mucous membranes are moist.      Pharynx: Oropharynx is clear.   Eyes:      Extraocular Movements: Extraocular movements intact.      Conjunctiva/sclera: Conjunctivae normal.      Pupils: Pupils are equal, round, and reactive to light.   Cardiovascular:      Rate and Rhythm: Normal rate.   Pulmonary:      Effort: Pulmonary effort is normal.      Breath sounds: Normal breath sounds.   Lymphadenopathy:      Cervical: Cervical adenopathy present.   Skin:     General: Skin is warm and dry.   Neurological:      General: No focal deficit present.      Mental Status: She is alert and oriented to person, place, and time.   Psychiatric:         Mood and Affect: Mood normal.         Behavior: Behavior normal.           Assessment/Plan/MDM  Therese was seen today for uri.  Diagnoses and all orders for this visit:  Acute non-recurrent maxillary sinusitis (Primary)  -      amoxicillin-pot clavulanate (Augmentin) 875-125 mg tablet; Take 1 tablet by mouth 2 times a day for 7 days.    Encouraged pt to use otc cold remedies PRN, push PO fluids and rest. Patient's clinical presentation is otherwise unremarkable at this time. Patient is discharged with instructions to follow-up with primary care or seek emergency medical attention for worsening symptoms or any new concerns.    I did personally review Therese's past medical history, surgical history, social history, as well as family history (when relevant).  In this case, I also oversaw the her drug management by reviewing her medication list, allergy list, as well as the medications that I prescribed during the UC course and/or recommended as an out-patient (including possible OTC medications such as acetaminophen, NSAIDs , etc).    After reviewing the items above, I did look at previous medical documentation, such as recent hospitalizations, office visits, and/or recent consultations with PCP/specialist.                          SDOH:   Another factor that I considered in Therese's care was her Social Determinants of Health (SDOH). During this UC encounter, she did not have social determinants of health. Those SDOH influencing Therese's care are: none      Ananth Rosario CNP  [facility]  [phone]

## 2025-01-18 DIAGNOSIS — E78.5 HYPERLIPIDEMIA, UNSPECIFIED HYPERLIPIDEMIA TYPE: ICD-10-CM

## 2025-01-21 DIAGNOSIS — E78.5 HYPERLIPIDEMIA, UNSPECIFIED HYPERLIPIDEMIA TYPE: ICD-10-CM

## 2025-01-21 RX ORDER — SIMVASTATIN 20 MG/1
20 TABLET, FILM COATED ORAL EVERY EVENING
Qty: 90 TABLET | Refills: 3 | OUTPATIENT
Start: 2025-01-21

## 2025-01-21 NOTE — TELEPHONE ENCOUNTER
Medication Refill Request    Medication:  simvastatin    Pharmacy:  [facility]    Last OV:  2/5/24  Upcoming appointment:  3/28/25    ORDER PENDED

## 2025-01-21 NOTE — TELEPHONE ENCOUNTER
Message from patient that she's not happy.  [facility] is telling her they can't complete her order and she wants to know why it wasn't approved.  It's for her Simvastatin 20mg   Her appt isn't until May and she needs her Rx

## 2025-01-22 RX ORDER — SIMVASTATIN 20 MG/1
20 TABLET, FILM COATED ORAL EVERY EVENING
Qty: 90 TABLET | Refills: 0 | Status: SHIPPED | OUTPATIENT
Start: 2025-01-22 | End: 2026-01-22

## 2025-02-04 ENCOUNTER — APPOINTMENT (OUTPATIENT)
Dept: PRIMARY CARE | Facility: CLINIC | Age: 75
End: 2025-02-04
Payer: MEDICARE

## 2025-02-09 DIAGNOSIS — R23.2 HOT FLASHES: ICD-10-CM

## 2025-02-09 DIAGNOSIS — I10 PRIMARY HYPERTENSION: ICD-10-CM

## 2025-02-10 DIAGNOSIS — R23.2 HOT FLASHES: ICD-10-CM

## 2025-02-10 DIAGNOSIS — I10 PRIMARY HYPERTENSION: ICD-10-CM

## 2025-02-10 RX ORDER — DULOXETIN HYDROCHLORIDE 30 MG/1
30 CAPSULE, DELAYED RELEASE ORAL DAILY
Qty: 90 CAPSULE | Refills: 3 | OUTPATIENT
Start: 2025-02-10

## 2025-02-10 RX ORDER — AMLODIPINE BESYLATE 5 MG/1
5 TABLET ORAL DAILY
Qty: 90 TABLET | Refills: 3 | Status: SHIPPED | OUTPATIENT
Start: 2025-02-10 | End: 2026-02-10

## 2025-02-10 RX ORDER — DULOXETIN HYDROCHLORIDE 30 MG/1
30 CAPSULE, DELAYED RELEASE ORAL
Qty: 90 CAPSULE | Refills: 3 | Status: SHIPPED | OUTPATIENT
Start: 2025-02-10 | End: 2026-02-10

## 2025-02-10 RX ORDER — AMLODIPINE BESYLATE 5 MG/1
5 TABLET ORAL DAILY
Qty: 90 TABLET | Refills: 3 | OUTPATIENT
Start: 2025-02-10

## 2025-02-10 NOTE — TELEPHONE ENCOUNTER
Medication Refill Request    Medication:  amlodipine, duloxetine    Pharmacy:  [facility]    Last OV:  2/5/24  Upcoming appointment:  3/28/25    ORDER PENDED

## 2025-02-20 NOTE — PROGRESS NOTES
Subjective   Patient ID: Therese Mike is a 74 y.o. female    HPI  74 y.o. female who presents to [facility] for healthcare maintenance. She presented to the ED on 12/30/2024 with complaints of progressive right lower quadrant pain for several days. Took a dose of her 's pain medication earlier that day. Denied fever. No known sick contacts. UA not concerning for UTI. CT showed Mildly distended fluid filled small bowel in the left abdomen which could be related to ileus or enteritis. No evidence of obstruction.     Today, she denies any urinary symptoms such as urgency, frequency, hematuria, pain, burning, and infections.      The most recent CT abdomen pelvis w IV contrast, conducted on 12/30/2024, revealed:  Mildly distended fluid filled small bowel in the left abdomen which  could be related to ileus or enteritis. No evidence of obstruction.      Hepatomegaly and coronary artery calcifications. The appendix is  unremarkable.       Review of Systems    All systems were reviewed. Anything negative was noted in the HPI.    Objective   Physical Exam    General: Well developed, well nourished, alert and cooperative, appears in no acute distress   Eyes: Non-injected conjunctiva, sclera clear, no proptosis   Cardiac: Extremities are warm and well perfused. No edema, cyanosis or pallor   Lungs: Breathing is easy, non-labored. Speaking in clear and complete sentences. Normal diaphragmatic movement   MSK: Ambulatory with steady gait, unassisted   Neuro: Alert and oriented to person, place, and time   Psych: Demonstrates good judgment and reason, without hallucinations, abnormal affect or abnormal behaviors   Skin: No obvious lesions, no rashes       No CVA tenderness bilaterally   No suprapubic pain or discomfort       Past Medical History:   Diagnosis Date    Hypertension     Personal history of diseases of the skin and subcutaneous tissue 01/30/2014    History of rosacea    Unspecified purulent endophthalmitis,  unspecified eye     Eye infection         No past surgical history on file.      Assessment/Plan   Bladder wall thickening, right abdominal pain with negative CT     74 y.o. female who presents for the above condition, We had a very long and extensive discussion with the patient regarding the pathophysiology, differential diagnosis, risk factor, management, natural history, incidence and diagnostic work-up of the condition.        - Avoid caffeine entirely     - Advised pt to drink at least 60 to 80 ounces of water daily.     - Advised pt to avoid foods high in oxalate (coffee and tea, nuts and chocolate, kale and spinach).    Plan:  - Follow-up in 1 year      E&M visit today is associated with current or anticipated ongoing medical care services related to a patient's single, serious condition or a complex condition.     2/24/2025    Scribe Attestation  By signing my name below, Rica JOHANSEN Scribe attest that this documentation has been prepared under the direction and in the presence of Dr. Rocky Medrano.

## 2025-02-24 ENCOUNTER — APPOINTMENT (OUTPATIENT)
Dept: UROLOGY | Facility: CLINIC | Age: 75
End: 2025-02-24
Payer: MEDICARE

## 2025-02-24 VITALS
DIASTOLIC BLOOD PRESSURE: 70 MMHG | WEIGHT: 198 LBS | HEIGHT: 64 IN | HEART RATE: 69 BPM | SYSTOLIC BLOOD PRESSURE: 119 MMHG | BODY MASS INDEX: 33.8 KG/M2

## 2025-02-24 DIAGNOSIS — N32.89 BLADDER WALL THICKENING: Primary | ICD-10-CM

## 2025-02-24 PROCEDURE — 3078F DIAST BP <80 MM HG: CPT | Performed by: STUDENT IN AN ORGANIZED HEALTH CARE EDUCATION/TRAINING PROGRAM

## 2025-02-24 PROCEDURE — 3008F BODY MASS INDEX DOCD: CPT | Performed by: STUDENT IN AN ORGANIZED HEALTH CARE EDUCATION/TRAINING PROGRAM

## 2025-02-24 PROCEDURE — G2211 COMPLEX E/M VISIT ADD ON: HCPCS | Performed by: STUDENT IN AN ORGANIZED HEALTH CARE EDUCATION/TRAINING PROGRAM

## 2025-02-24 PROCEDURE — 99214 OFFICE O/P EST MOD 30 MIN: CPT | Performed by: STUDENT IN AN ORGANIZED HEALTH CARE EDUCATION/TRAINING PROGRAM

## 2025-02-24 PROCEDURE — 3074F SYST BP LT 130 MM HG: CPT | Performed by: STUDENT IN AN ORGANIZED HEALTH CARE EDUCATION/TRAINING PROGRAM

## 2025-02-24 PROCEDURE — 1159F MED LIST DOCD IN RCRD: CPT | Performed by: STUDENT IN AN ORGANIZED HEALTH CARE EDUCATION/TRAINING PROGRAM

## 2025-02-24 ASSESSMENT — PATIENT HEALTH QUESTIONNAIRE - PHQ9
1. LITTLE INTEREST OR PLEASURE IN DOING THINGS: NOT AT ALL
2. FEELING DOWN, DEPRESSED OR HOPELESS: NOT AT ALL
SUM OF ALL RESPONSES TO PHQ9 QUESTIONS 1 AND 2: 0

## 2025-02-24 ASSESSMENT — ENCOUNTER SYMPTOMS
OCCASIONAL FEELINGS OF UNSTEADINESS: 0
DEPRESSION: 0
LOSS OF SENSATION IN FEET: 0

## 2025-02-27 ENCOUNTER — APPOINTMENT (OUTPATIENT)
Dept: GASTROENTEROLOGY | Facility: CLINIC | Age: 75
End: 2025-02-27
Payer: MEDICARE

## 2025-03-03 ENCOUNTER — APPOINTMENT (OUTPATIENT)
Dept: GASTROENTEROLOGY | Facility: CLINIC | Age: 75
End: 2025-03-03
Payer: MEDICARE

## 2025-03-03 VITALS — BODY MASS INDEX: 33.63 KG/M2 | WEIGHT: 197 LBS | HEIGHT: 64 IN

## 2025-03-03 DIAGNOSIS — R10.31 RIGHT LOWER QUADRANT PAIN: Primary | ICD-10-CM

## 2025-03-03 PROCEDURE — 1159F MED LIST DOCD IN RCRD: CPT | Performed by: INTERNAL MEDICINE

## 2025-03-03 PROCEDURE — 99214 OFFICE O/P EST MOD 30 MIN: CPT | Performed by: INTERNAL MEDICINE

## 2025-03-03 PROCEDURE — 1036F TOBACCO NON-USER: CPT | Performed by: INTERNAL MEDICINE

## 2025-03-03 PROCEDURE — 1160F RVW MEDS BY RX/DR IN RCRD: CPT | Performed by: INTERNAL MEDICINE

## 2025-03-03 PROCEDURE — 3008F BODY MASS INDEX DOCD: CPT | Performed by: INTERNAL MEDICINE

## 2025-03-03 ASSESSMENT — ENCOUNTER SYMPTOMS
EYES NEGATIVE: 1
NEUROLOGICAL NEGATIVE: 1
RESPIRATORY NEGATIVE: 1
CARDIOVASCULAR NEGATIVE: 1
CONSTITUTIONAL NEGATIVE: 1
HEMATOLOGIC/LYMPHATIC NEGATIVE: 1
ENDOCRINE NEGATIVE: 1

## 2025-03-03 NOTE — PROGRESS NOTES
Subjective   Patient ID: Therese Mike is a 74 y.o. female who presents for Follow-up (Pt presents today as a follow up with complaints of RLQ pain. Pt states that she is not currently having pain, but thinks that when she does it is either stress induced or diet induced. Pt states she was seen in ED on 12/30/24 for the same. A CT scan was done at this visit as well. ).    HPI Therese presents today in ER follow-up.  Was in the emergency room late January with acute right lower quad abdominal pain pain began 2 days prior to presentation ER.  Pain sharp stabbing no vomiting no fevers no chills no rectal bleeding or alteration in bowel movements.  CT scan done while in ER showed possible enteritis no evidence of diverticulitis ovarian abscess mass or inflammatory bowel disease.  Symptoms spontaneously remitted shortly after ER visit.    She did see urology who felt she might of had a kidney stone which passed spontaneously.  She remains him symptomatic at this time    Review of Systems   Constitutional: Negative.    HENT: Negative.     Eyes: Negative.    Respiratory: Negative.     Cardiovascular: Negative.    Endocrine: Negative.    Genitourinary: Negative.    Neurological: Negative.    Hematological: Negative.        Objective   Physical Exam  Vitals and nursing note reviewed.   Constitutional:       Appearance: Normal appearance.   HENT:      Head: Normocephalic.      Mouth/Throat:      Mouth: Mucous membranes are moist.      Pharynx: Oropharynx is clear.   Eyes:      Conjunctiva/sclera: Conjunctivae normal.      Pupils: Pupils are equal, round, and reactive to light.   Cardiovascular:      Rate and Rhythm: Normal rate and regular rhythm.      Pulses: Normal pulses.      Heart sounds: Normal heart sounds.   Pulmonary:      Effort: Pulmonary effort is normal.      Breath sounds: Normal breath sounds.   Abdominal:      General: Abdomen is flat. Bowel sounds are normal.      Palpations: Abdomen is soft.    Musculoskeletal:         General: Normal range of motion.      Cervical back: Normal range of motion and neck supple.   Skin:     General: Skin is warm and dry.   Neurological:      General: No focal deficit present.      Mental Status: She is alert and oriented to person, place, and time.   Psychiatric:         Behavior: Behavior normal.         Assessment/Plan   Diagnoses and all orders for this visit:  Right lower quadrant pain    Right lower quadrant pain that resolved spontaneously.  May have been a kidney stone.  Is up-to-date on colonoscopy advised her to continue to monitor symptoms if recurs or has new symptoms would recommend further imaging and colonoscopy       Mo Potter DO 03/03/25 4:41 PM

## 2025-03-18 ENCOUNTER — TELEPHONE (OUTPATIENT)
Dept: PRIMARY CARE | Facility: CLINIC | Age: 75
End: 2025-03-18
Payer: MEDICARE

## 2025-03-18 DIAGNOSIS — E55.9 VITAMIN D DEFICIENCY: ICD-10-CM

## 2025-03-18 DIAGNOSIS — E78.5 HYPERLIPIDEMIA, UNSPECIFIED HYPERLIPIDEMIA TYPE: ICD-10-CM

## 2025-03-18 DIAGNOSIS — I10 PRIMARY HYPERTENSION: Primary | ICD-10-CM

## 2025-03-18 DIAGNOSIS — E53.8 VITAMIN B12 DEFICIENCY: ICD-10-CM

## 2025-03-18 NOTE — TELEPHONE ENCOUNTER
PATIENT CALLED IN TO ADVISE HER LAB ORDERS ARE NOW . SHE HAS OV WITH DR DAVIDSON ON 25.     PLEASE PLACE NEW LAB ORDERS FOR HER.     PLEASE CALL PATIENT TO ADVISE WHEN PLACED SO SHE CAN GO TO THE LAB.

## 2025-03-19 NOTE — TELEPHONE ENCOUNTER
Called and left a detailed message on a verified VM, patient is to call back with questions or concerns.

## 2025-03-26 LAB
25(OH)D3+25(OH)D2 SERPL-MCNC: 13 NG/ML (ref 30–100)
ALBUMIN SERPL-MCNC: 4.4 G/DL (ref 3.6–5.1)
ALP SERPL-CCNC: 87 U/L (ref 37–153)
ALT SERPL-CCNC: 22 U/L (ref 6–29)
ANION GAP SERPL CALCULATED.4IONS-SCNC: 9 MMOL/L (CALC) (ref 7–17)
AST SERPL-CCNC: 20 U/L (ref 10–35)
BILIRUB SERPL-MCNC: 0.6 MG/DL (ref 0.2–1.2)
BUN SERPL-MCNC: 13 MG/DL (ref 7–25)
CALCIUM SERPL-MCNC: 9.8 MG/DL (ref 8.6–10.4)
CHLORIDE SERPL-SCNC: 104 MMOL/L (ref 98–110)
CHOLEST SERPL-MCNC: 230 MG/DL
CHOLEST/HDLC SERPL: 2.6 (CALC)
CO2 SERPL-SCNC: 28 MMOL/L (ref 20–32)
CREAT SERPL-MCNC: 0.71 MG/DL (ref 0.6–1)
EGFRCR SERPLBLD CKD-EPI 2021: 89 ML/MIN/1.73M2
ERYTHROCYTE [DISTWIDTH] IN BLOOD BY AUTOMATED COUNT: 13 % (ref 11–15)
GLUCOSE SERPL-MCNC: 75 MG/DL (ref 65–99)
HCT VFR BLD AUTO: 47.4 % (ref 35–45)
HDLC SERPL-MCNC: 88 MG/DL
HGB BLD-MCNC: 15.7 G/DL (ref 11.7–15.5)
LDLC SERPL CALC-MCNC: 110 MG/DL (CALC)
MCH RBC QN AUTO: 30.4 PG (ref 27–33)
MCHC RBC AUTO-ENTMCNC: 33.1 G/DL (ref 32–36)
MCV RBC AUTO: 91.9 FL (ref 80–100)
NONHDLC SERPL-MCNC: 142 MG/DL (CALC)
PLATELET # BLD AUTO: 250 THOUSAND/UL (ref 140–400)
PMV BLD REES-ECKER: 9.9 FL (ref 7.5–12.5)
POTASSIUM SERPL-SCNC: 4.8 MMOL/L (ref 3.5–5.3)
PROT SERPL-MCNC: 6.7 G/DL (ref 6.1–8.1)
RBC # BLD AUTO: 5.16 MILLION/UL (ref 3.8–5.1)
SODIUM SERPL-SCNC: 141 MMOL/L (ref 135–146)
TRIGL SERPL-MCNC: 208 MG/DL
VIT B12 SERPL-MCNC: 816 PG/ML (ref 200–1100)
WBC # BLD AUTO: 3.5 THOUSAND/UL (ref 3.8–10.8)

## 2025-03-28 ENCOUNTER — APPOINTMENT (OUTPATIENT)
Dept: PRIMARY CARE | Facility: CLINIC | Age: 75
End: 2025-03-28
Payer: MEDICARE

## 2025-03-28 VITALS
WEIGHT: 196.6 LBS | OXYGEN SATURATION: 96 % | HEART RATE: 68 BPM | DIASTOLIC BLOOD PRESSURE: 74 MMHG | SYSTOLIC BLOOD PRESSURE: 140 MMHG | BODY MASS INDEX: 33.75 KG/M2

## 2025-03-28 DIAGNOSIS — E55.9 VITAMIN D DEFICIENCY: ICD-10-CM

## 2025-03-28 DIAGNOSIS — E78.5 HYPERLIPIDEMIA, UNSPECIFIED HYPERLIPIDEMIA TYPE: ICD-10-CM

## 2025-03-28 DIAGNOSIS — Z12.31 ENCOUNTER FOR SCREENING MAMMOGRAM FOR BREAST CANCER: ICD-10-CM

## 2025-03-28 DIAGNOSIS — Z00.00 ROUTINE GENERAL MEDICAL EXAMINATION AT HEALTH CARE FACILITY: Primary | ICD-10-CM

## 2025-03-28 PROCEDURE — 1160F RVW MEDS BY RX/DR IN RCRD: CPT | Performed by: FAMILY MEDICINE

## 2025-03-28 PROCEDURE — 3077F SYST BP >= 140 MM HG: CPT | Performed by: FAMILY MEDICINE

## 2025-03-28 PROCEDURE — 1170F FXNL STATUS ASSESSED: CPT | Performed by: FAMILY MEDICINE

## 2025-03-28 PROCEDURE — 99214 OFFICE O/P EST MOD 30 MIN: CPT | Performed by: FAMILY MEDICINE

## 2025-03-28 PROCEDURE — 1159F MED LIST DOCD IN RCRD: CPT | Performed by: FAMILY MEDICINE

## 2025-03-28 PROCEDURE — 1036F TOBACCO NON-USER: CPT | Performed by: FAMILY MEDICINE

## 2025-03-28 PROCEDURE — G0439 PPPS, SUBSEQ VISIT: HCPCS | Performed by: FAMILY MEDICINE

## 2025-03-28 PROCEDURE — 3078F DIAST BP <80 MM HG: CPT | Performed by: FAMILY MEDICINE

## 2025-03-28 ASSESSMENT — ENCOUNTER SYMPTOMS
OCCASIONAL FEELINGS OF UNSTEADINESS: 0
BLOOD IN STOOL: 0
HEADACHES: 0
DEPRESSION: 0
HEMATURIA: 0
PALPITATIONS: 0
LOSS OF SENSATION IN FEET: 0
DIFFICULTY URINATING: 0

## 2025-03-28 ASSESSMENT — ACTIVITIES OF DAILY LIVING (ADL)
DOING_HOUSEWORK: INDEPENDENT
BATHING: INDEPENDENT
DRESSING: INDEPENDENT
GROCERY_SHOPPING: INDEPENDENT
TAKING_MEDICATION: INDEPENDENT
MANAGING_FINANCES: INDEPENDENT

## 2025-03-28 ASSESSMENT — PATIENT HEALTH QUESTIONNAIRE - PHQ9
SUM OF ALL RESPONSES TO PHQ9 QUESTIONS 1 AND 2: 0
2. FEELING DOWN, DEPRESSED OR HOPELESS: NOT AT ALL
1. LITTLE INTEREST OR PLEASURE IN DOING THINGS: NOT AT ALL

## 2025-03-28 NOTE — PROGRESS NOTES
Subjective   Reason for Visit: Therese Mike is an 74 y.o. female here for a Medicare Wellness visit.     Past Medical, Surgical, and Family History reviewed and updated in chart.    Reviewed all medications by prescribing practitioner or clinical pharmacist (such as prescriptions, OTCs, herbal therapies and supplements) and documented in the medical record.    HPI  Labs March 24, 2025 vitamin D low 13  Vitamin B12 normal 816  Total cholesterol 230 triglycerides 208  HDL 88  CBC hemoglobin 15.7  CMP normal      HPOA  yes   Living will yes   Code Full code     HTN well controlled on meds     Home 136/ x as and average      Hyperlipidemia on zocor dx 2005 with no problems    Chol is borderline but has high hdl      H/o vit d def but was normal last check      Will restart vitamin D       Was only 25 Jan 2024      Vitamin b12 on po daily      Acne rosacea Derm  Dr Pardo [facility]ium doxycline 20 mg bid for flares      Colonoscopy      Last was sept 2020 recommend every 5 years   Hot flashes      Duloxetine  25 years   Mammogram is due     Getting knee injections   Every 3 months         Patient Care Team:  Bradly Mcdaniel MD as PCP - General (Family Medicine)  Bradly Mcdaniel MD as PCP - tna Medicare Advantage PCP     Review of Systems   Cardiovascular:  Negative for chest pain and palpitations.   Gastrointestinal:  Negative for blood in stool.   Genitourinary:  Negative for difficulty urinating and hematuria.   Neurological:  Negative for headaches.       Objective   Vitals:  /74   Pulse 68   Wt 89.2 kg (196 lb 9.6 oz)   SpO2 96%   BMI 33.75 kg/m²       Physical Exam  Vitals reviewed.   Constitutional:       Appearance: Normal appearance.   HENT:      Head: Normocephalic and atraumatic.   Eyes:      Conjunctiva/sclera: Conjunctivae normal.   Cardiovascular:      Rate and Rhythm: Normal rate and regular rhythm.   Pulmonary:      Effort: Pulmonary effort is normal.      Breath sounds: Normal breath  sounds.   Musculoskeletal:      Cervical back: Neck supple.   Skin:     General: Skin is warm and dry.   Neurological:      General: No focal deficit present.      Mental Status: She is alert and oriented to person, place, and time.   Psychiatric:         Mood and Affect: Mood normal.         Behavior: Behavior normal.         Thought Content: Thought content normal.         Judgment: Judgment normal.         Assessment & Plan  Routine general medical examination at health care facility    Orders:    1 Year Follow Up In Primary Care - Wellness Exam; Future    Vitamin D deficiency    Orders:    Vitamin D 25-Hydroxy,Total (for eval of Vitamin D levels); Future    Encounter for screening mammogram for breast cancer    Orders:    BI mammo bilateral screening tomosynthesis; Future    Hyperlipidemia, unspecified hyperlipidemia type    Orders:    Lipid Panel; Future    Comprehensive Metabolic Panel; Future    CBC and Auto Differential; Future

## 2025-04-17 ENCOUNTER — APPOINTMENT (OUTPATIENT)
Dept: GASTROENTEROLOGY | Facility: CLINIC | Age: 75
End: 2025-04-17
Payer: MEDICARE

## 2025-04-18 DIAGNOSIS — I10 PRIMARY HYPERTENSION: ICD-10-CM

## 2025-04-18 DIAGNOSIS — R23.2 HOT FLASHES: ICD-10-CM

## 2025-04-18 DIAGNOSIS — E78.5 HYPERLIPIDEMIA, UNSPECIFIED HYPERLIPIDEMIA TYPE: ICD-10-CM

## 2025-04-18 RX ORDER — AMLODIPINE BESYLATE 5 MG/1
5 TABLET ORAL DAILY
Qty: 100 TABLET | Refills: 2 | Status: SHIPPED | OUTPATIENT
Start: 2025-04-18

## 2025-04-18 RX ORDER — SIMVASTATIN 20 MG/1
20 TABLET, FILM COATED ORAL EVERY EVENING
Qty: 100 TABLET | Refills: 2 | Status: SHIPPED | OUTPATIENT
Start: 2025-04-18

## 2025-04-18 RX ORDER — SIMVASTATIN 20 MG/1
20 TABLET, FILM COATED ORAL EVERY EVENING
Qty: 90 TABLET | Refills: 0 | OUTPATIENT
Start: 2025-04-18

## 2025-04-18 RX ORDER — DULOXETIN HYDROCHLORIDE 30 MG/1
30 CAPSULE, DELAYED RELEASE ORAL DAILY
Qty: 100 CAPSULE | Refills: 2 | Status: SHIPPED | OUTPATIENT
Start: 2025-04-18

## 2025-04-18 NOTE — TELEPHONE ENCOUNTER
Medication Refill Request    Medication:  duloxetine, simvastatin, amlodipine    Pharmacy:  [facility]    Last OV:  3/28/25  Upcoming appointment:  3/27/26    ORDER PENDED

## 2025-04-28 ENCOUNTER — APPOINTMENT (OUTPATIENT)
Dept: RADIOLOGY | Facility: HOSPITAL | Age: 75
End: 2025-04-28
Payer: MEDICARE

## 2025-06-12 ENCOUNTER — TELEPHONE (OUTPATIENT)
Dept: PRIMARY CARE | Facility: CLINIC | Age: 75
End: 2025-06-12
Payer: MEDICARE

## 2025-06-12 DIAGNOSIS — L71.9 ROSACEA: ICD-10-CM

## 2025-06-12 RX ORDER — DOXYCYCLINE HYCLATE 20 MG
20 TABLET ORAL 2 TIMES DAILY PRN
Qty: 30 TABLET | Refills: 0 | Status: SHIPPED | OUTPATIENT
Start: 2025-06-12

## 2025-07-16 ENCOUNTER — TELEPHONE (OUTPATIENT)
Dept: GASTROENTEROLOGY | Facility: CLINIC | Age: 75
End: 2025-07-16
Payer: MEDICARE

## 2025-09-04 ENCOUNTER — TELEPHONE (OUTPATIENT)
Dept: PRIMARY CARE | Facility: CLINIC | Age: 75
End: 2025-09-04
Payer: MEDICARE

## 2025-09-04 DIAGNOSIS — I10 PRIMARY HYPERTENSION: ICD-10-CM

## 2025-09-04 RX ORDER — ATENOLOL 25 MG/1
25 TABLET ORAL DAILY
Qty: 90 TABLET | Refills: 2 | Status: SHIPPED | OUTPATIENT
Start: 2025-09-04

## 2026-03-27 ENCOUNTER — APPOINTMENT (OUTPATIENT)
Dept: PRIMARY CARE | Facility: CLINIC | Age: 76
End: 2026-03-27
Payer: MEDICARE